# Patient Record
Sex: FEMALE | URBAN - METROPOLITAN AREA
[De-identification: names, ages, dates, MRNs, and addresses within clinical notes are randomized per-mention and may not be internally consistent; named-entity substitution may affect disease eponyms.]

---

## 2021-02-16 ENCOUNTER — TELEPHONE (OUTPATIENT)
Dept: PLASTIC SURGERY | Facility: CLINIC | Age: 26
End: 2021-02-16

## 2021-02-16 NOTE — TELEPHONE ENCOUNTER
[facility] Call Center    Phone Message    May a detailed message be left on voicemail: yes     Reason for Call: Appointment Intake    Referring Provider Name: Self, Unknown  Diagnosis and/or Symptoms: Phalloplasty consultation     Patient sent in the following online appointment request and can be reached at [phone]. Thanks!    Action Taken: Message routed to:  [facility] ([facility]): Plastics / Gender Care    Travel Screening: Not Applicable

## 2021-02-16 NOTE — TELEPHONE ENCOUNTER
Called pt miriam online appt request for phalloplasty consult. No answer, [facility] with [facility] [place] information.     Deuce Morin

## 2021-02-17 NOTE — TELEPHONE ENCOUNTER
Called pt again after they left voicemail. No answer, LVM with [facility] contact information.     Deuce Morin